# Patient Record
Sex: MALE | Race: WHITE | NOT HISPANIC OR LATINO | Employment: OTHER | RURAL
[De-identification: names, ages, dates, MRNs, and addresses within clinical notes are randomized per-mention and may not be internally consistent; named-entity substitution may affect disease eponyms.]

---

## 2017-06-15 ENCOUNTER — HISTORICAL (OUTPATIENT)
Dept: ADMINISTRATIVE | Facility: HOSPITAL | Age: 62
End: 2017-06-15

## 2017-06-16 LAB
LAB AP CLINICAL INFORMATION: NORMAL
LAB AP COMMENTS: NORMAL
LAB AP DIAGNOSIS - HISTORICAL: NORMAL
LAB AP MICROSCOPIC PATHOLOGY - HISTORICAL: NORMAL
LAB AP SPECIMEN SUBMITTED - HISTORICAL: NORMAL

## 2018-06-19 ENCOUNTER — HISTORICAL (OUTPATIENT)
Dept: ADMINISTRATIVE | Facility: HOSPITAL | Age: 63
End: 2018-06-19

## 2020-10-27 ENCOUNTER — HISTORICAL (OUTPATIENT)
Dept: ADMINISTRATIVE | Facility: HOSPITAL | Age: 65
End: 2020-10-27

## 2021-10-27 ENCOUNTER — OFFICE VISIT (OUTPATIENT)
Dept: UROLOGY | Facility: CLINIC | Age: 66
End: 2021-10-27
Payer: MEDICARE

## 2021-10-27 ENCOUNTER — HOSPITAL ENCOUNTER (OUTPATIENT)
Dept: RADIOLOGY | Facility: HOSPITAL | Age: 66
Discharge: HOME | End: 2021-10-27
Attending: UROLOGY
Payer: MEDICARE

## 2021-10-27 VITALS
WEIGHT: 198 LBS | BODY MASS INDEX: 23.38 KG/M2 | HEIGHT: 77 IN | SYSTOLIC BLOOD PRESSURE: 143 MMHG | HEART RATE: 78 BPM | DIASTOLIC BLOOD PRESSURE: 88 MMHG

## 2021-10-27 DIAGNOSIS — N32.0 BLADDER OUTLET OBSTRUCTION: ICD-10-CM

## 2021-10-27 DIAGNOSIS — Z12.5 SCREENING FOR PROSTATE CANCER: ICD-10-CM

## 2021-10-27 DIAGNOSIS — N13.8 BENIGN PROSTATIC HYPERPLASIA WITH URINARY OBSTRUCTION: ICD-10-CM

## 2021-10-27 DIAGNOSIS — N20.0 KIDNEY STONE: Primary | ICD-10-CM

## 2021-10-27 DIAGNOSIS — N40.1 BENIGN PROSTATIC HYPERPLASIA WITH URINARY OBSTRUCTION: ICD-10-CM

## 2021-10-27 DIAGNOSIS — N20.0 KIDNEY STONE: ICD-10-CM

## 2021-10-27 PROCEDURE — 74018 RADEX ABDOMEN 1 VIEW: CPT | Mod: TC

## 2021-10-27 PROCEDURE — 99213 PR OFFICE/OUTPT VISIT, EST, LEVL III, 20-29 MIN: ICD-10-PCS | Mod: S$PBB,,, | Performed by: UROLOGY

## 2021-10-27 PROCEDURE — 99213 OFFICE O/P EST LOW 20 MIN: CPT | Mod: S$PBB,,, | Performed by: UROLOGY

## 2021-10-27 PROCEDURE — 74018 XR KUB: ICD-10-PCS | Mod: 26,,, | Performed by: RADIOLOGY

## 2021-10-27 PROCEDURE — 74018 RADEX ABDOMEN 1 VIEW: CPT | Mod: 26,,, | Performed by: RADIOLOGY

## 2021-10-27 PROCEDURE — 99214 OFFICE O/P EST MOD 30 MIN: CPT | Mod: PBBFAC | Performed by: UROLOGY

## 2021-10-27 RX ORDER — TAMSULOSIN HYDROCHLORIDE 0.4 MG/1
1 CAPSULE ORAL 2 TIMES DAILY
COMMUNITY
Start: 2021-10-11

## 2021-10-27 RX ORDER — CELECOXIB 200 MG/1
CAPSULE ORAL
COMMUNITY

## 2021-10-27 RX ORDER — LOSARTAN POTASSIUM 25 MG/1
TABLET ORAL
COMMUNITY

## 2021-10-27 RX ORDER — LOVASTATIN 10 MG/1
TABLET ORAL
COMMUNITY

## 2021-10-27 RX ORDER — GABAPENTIN 300 MG/1
CAPSULE ORAL
COMMUNITY

## 2021-10-27 RX ORDER — ESOMEPRAZOLE MAGNESIUM 40 MG/1
CAPSULE, DELAYED RELEASE ORAL
COMMUNITY

## 2021-10-27 RX ORDER — ASPIRIN 81 MG/1
1 TABLET ORAL DAILY
COMMUNITY

## 2021-11-01 ENCOUNTER — TELEPHONE (OUTPATIENT)
Dept: UROLOGY | Facility: CLINIC | Age: 66
End: 2021-11-01
Payer: MEDICARE

## 2022-10-31 ENCOUNTER — HOSPITAL ENCOUNTER (OUTPATIENT)
Dept: RADIOLOGY | Facility: HOSPITAL | Age: 67
Discharge: HOME | End: 2022-10-31
Attending: UROLOGY
Payer: MEDICARE

## 2022-10-31 ENCOUNTER — OFFICE VISIT (OUTPATIENT)
Dept: UROLOGY | Facility: CLINIC | Age: 67
End: 2022-10-31
Payer: MEDICARE

## 2022-10-31 VITALS
HEART RATE: 81 BPM | DIASTOLIC BLOOD PRESSURE: 82 MMHG | SYSTOLIC BLOOD PRESSURE: 134 MMHG | HEIGHT: 77 IN | WEIGHT: 198 LBS | BODY MASS INDEX: 23.38 KG/M2

## 2022-10-31 DIAGNOSIS — N41.1 CHRONIC PROSTATITIS: ICD-10-CM

## 2022-10-31 DIAGNOSIS — N20.0 KIDNEY STONES: Primary | ICD-10-CM

## 2022-10-31 DIAGNOSIS — N13.8 BENIGN PROSTATIC HYPERPLASIA WITH URINARY OBSTRUCTION: ICD-10-CM

## 2022-10-31 DIAGNOSIS — N40.1 BENIGN PROSTATIC HYPERPLASIA WITH URINARY OBSTRUCTION: ICD-10-CM

## 2022-10-31 DIAGNOSIS — Z12.5 SCREENING FOR PROSTATE CANCER: ICD-10-CM

## 2022-10-31 DIAGNOSIS — N32.0 BLADDER OUTLET OBSTRUCTION: ICD-10-CM

## 2022-10-31 DIAGNOSIS — N20.0 KIDNEY STONE: ICD-10-CM

## 2022-10-31 PROCEDURE — 99213 PR OFFICE/OUTPT VISIT, EST, LEVL III, 20-29 MIN: ICD-10-PCS | Mod: S$PBB,,, | Performed by: UROLOGY

## 2022-10-31 PROCEDURE — 99213 OFFICE O/P EST LOW 20 MIN: CPT | Mod: S$PBB,,, | Performed by: UROLOGY

## 2022-10-31 PROCEDURE — 99214 OFFICE O/P EST MOD 30 MIN: CPT | Mod: PBBFAC | Performed by: UROLOGY

## 2022-10-31 PROCEDURE — 74018 RADEX ABDOMEN 1 VIEW: CPT | Mod: 26,,, | Performed by: STUDENT IN AN ORGANIZED HEALTH CARE EDUCATION/TRAINING PROGRAM

## 2022-10-31 PROCEDURE — 74018 XR KUB: ICD-10-PCS | Mod: 26,,, | Performed by: STUDENT IN AN ORGANIZED HEALTH CARE EDUCATION/TRAINING PROGRAM

## 2022-10-31 PROCEDURE — 74018 RADEX ABDOMEN 1 VIEW: CPT | Mod: TC

## 2022-10-31 NOTE — PROGRESS NOTES
"Subjective:       Patient ID: Gamaliel Castillo is a 67 y.o. male.    Chief Complaint: Follow-up (One year visit, KUB)       Chief Complaint/Reason For Encounter  "One year visit, KUB";   Benign prostatic hyperplasia with lower urinary tract symptoms; Bladder-neck obstruction; Encounter for screening for malignant neoplasm of prostate; Kidney stone     History of Present Illness  Mr. Castillo is 59 years old. Sent to me by Ms. Lisha Liz because of bladder outlet obstructive symptoms. He has had slowing of his stream with increased difficulty voiding for the last couple of years. He has nocturia only one time nightly if he restricts fluids, but sometimes  he does not restrict fluids. He has no history of previous treatment for prostate problems. He has never taken alpha blockers or any other medication to help with his urine flow. He has no history of urinary tract infections, hematuria, stones, or other problems except for the decreased size and force to his stream with increased sensation of restrictive flow. Apparently his PSA was 0.7 earlier this year. No family history of prostate cancer. He has friends who have prostate cancer and thinks about them when he has difficulty voiding. Otherwise negative  history  (August 6, 2015)     Mr. Castillo returns for follow-up. He did not improve any after being placed on Flomax on the above visit. He was placed on that with the idea that it should help with bladder outlet obstructive symptoms. It has not helped or at least not helped significantly despite the fact he still taking the medication. A couple weeks after that visit he had onset of left ureteral colic with pain in the left upper quadrant. He was told he had a stone in the emergency room. He initially went to the emergency room at [facility] in [place]. He had a CT scan done there. He subsequently went to the emergency room at [facility] where a CT was done with contrast. No stone was really found on " the imaging.   He had one previous stone on the right side about 15 years ago that apparently passed. This is the first episode of colicky pain he has had since that time. Currently asymptomatic. He is not aware if he has passed a stone or not. Brought CT scan from [facility] on disc.  (September 25, 2015)     Mr. Castillo has improved. Taking the Flomax twice daily has improved his situation with voiding significantly. He has nocturia only one time nightly now which is significantly better. He thinks he had his PSA done by Mrs. Liz when he has regular lab work done. I Do not have a copy of that. I would like to make sure that that was normal but presume that it was, or he would have been notified. Patient desires no additional help with his current situation and just wants to continue on Flomax twice a day.  (November 30, 2015)     Mr. Castillo is in for his yearly checkup. He had a stone that he had  on around August 15. He had gone to the [facility] emergency room where he had a CT scan. It is uncertain exactly the size of the stone or the exact location. He has continued to have some discomfort but has been relatively mild. He had a severe colicky episode September 5. No Other new  problems since last year Until last month. He has not been straining his urine and is unaware if stone might have passed or not. To his knowledge he has not passed a stone however.  (  He did not bring a copy of the CT scan on disc with him. Patient has not had a PSA to his knowledge. If He has had one he does not know what it is. (September 14, 2016.)     Since the visit last year Mr. Castillo is had one of the stone requiring to the emergency room. He thinks he passed that stone. He started having some suprapubic pain it is not localized to the right and left just just since this morning and felt it was probably a stone because of the severity of the pain. He has not had flank pain. He has voided better since we don't tamsulosin and  has nocturia usually does one time nightly. He also thinks that she's been on that drug the is has an easy year time passing stones as they seem to come through faster. Usually has worked to stones or year. He feels this is a stone today. PSA pending.  (September 19, 2017)     Mr. Castillo is in for his yearly checkup. Unfortunately his appointment was made one day ahead of when it should've been, so he cannot get his PSA today for PSA screening. He has had no further stone problems since he was here last year. 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 Patient has satisfactory stream with nocturia one time nightly. Otherwise no new health problems.  (September 18, 2018)     Mr. Castillo is in for his yearly checkup. He had a KUB today because of previous stone that he had in 2016. He is not aware of any recent stone or any problems from stones of any type. He is trying to keep increased fluid intake. He had PSA last year in May that was 1.04. Planning on getting the PSA when he has lab work by Ms. Ding this week. Overall satisfactory year. He continues to void reasonably well so long as he takes the tamsulosin twice a day. No new problems from  standpoint.  (October 1, 2019)     Mr. Castillo is in for one-year follow-up for stone problems and for PSA check and prostate exam. He injured his finger requiring a trip to the emergency room but otherwise done fairly well this year. Had a partial knee replacement in February. He has no increased trouble with voiding. Nocturia x2. He has had no stone pain or awareness of stone problems this year. PSA done on October 3, 2019 was 1.44.  Apparently has not had another PSA this year so far. (October 27, 2020)     PSA Results:  Past PSA Results   PSA was 1.44 on October 3, 2019  PSA was 1.04 on May 11, 2018  PSA was 1.020 on September 19, 2017  PSA was 1.280 on September 14,  2016  -----------------------------------------------------------------------------------------------------------------------------------------------------------------------------------------------------------------------------------------------------------------------------  The above information is from the old electronic medical record.    PSA was 1.57 on January 8, 2021     Mr. Castillo is in for his yearly check with KUB.  He had his PSA done by Ms. Ding within last year and we are not sure of the results.  Overall satisfactory year with no stone problems.  No worsening bladder outlet obstructive problems.  Has nocturia x2 with aid of tamsulosin.  (October 27, 2021)    Mr. Castillo is in for  yearly checkup.  He feels like he is doing reasonably well taking the tamsulosin b.i.d..  No worsening bladder outlet obstructive symptoms.  He has nocturia 2 times nightly which is what it has been for recent past.  He has had no new health problems.  His wife has problems primarily related to a fall she had several months ago.  PSA usually done by nurse practitioner but we do not have recent result.  Last year was 1.57.  [October 31, 2022]         Review of Systems      Objective:      Physical Exam  Constitutional:       Appearance: Normal appearance. He is normal weight.   Genitourinary:     Prostate: Normal.      Rectum: Normal.      Comments: Prostate was  30 g slightly firmer on the right side than the left but otherwise symmetrical  Neurological:      Mental Status: He is alert.   Psychiatric:         Mood and Affect: Mood normal.         Behavior: Behavior normal.     Urinalysis was unremarkable with occasional pus cells.  The dipstick negative pH of 6.0 and specific gravity 1.010  Assessment:       Problem List Items Addressed This Visit    None  Visit Diagnoses       Kidney stones    -  Primary    Relevant Orders    X-Ray KUB    Benign prostatic hyperplasia with urinary obstruction        Bladder outlet  obstruction        Chronic prostatitis        Screening for prostate cancer                Plan:         Patient doing okay clinically.  Continue tamsulosin b.i.d. He will let me know if he needs it renewed.  KUB reviewed with patient and no definite stones noted.  He will continue to get his PSAs by Ms. Ding.  One year appointment with BRENDA or sooner if needed.

## 2022-11-01 NOTE — PATIENT INSTRUCTIONS
Patient doing okay clinically.  Continue tamsulosin b.i.d. He will let me know if he needs it renewed.  KUB reviewed with patient and no definite stones noted.  He will continue to get his PSAs by Ms. Ding.  One year appointment with KUDELFINO or sooner if needed.

## 2023-10-31 ENCOUNTER — HOSPITAL ENCOUNTER (OUTPATIENT)
Dept: RADIOLOGY | Facility: HOSPITAL | Age: 68
Discharge: HOME | End: 2023-10-31
Attending: UROLOGY
Payer: MEDICARE

## 2023-10-31 ENCOUNTER — OFFICE VISIT (OUTPATIENT)
Dept: UROLOGY | Facility: CLINIC | Age: 68
End: 2023-10-31
Payer: MEDICARE

## 2023-10-31 VITALS
WEIGHT: 190 LBS | HEIGHT: 77 IN | DIASTOLIC BLOOD PRESSURE: 86 MMHG | HEART RATE: 77 BPM | SYSTOLIC BLOOD PRESSURE: 129 MMHG | BODY MASS INDEX: 22.43 KG/M2

## 2023-10-31 DIAGNOSIS — N41.1 CHRONIC PROSTATITIS: ICD-10-CM

## 2023-10-31 DIAGNOSIS — N20.0 KIDNEY STONES: ICD-10-CM

## 2023-10-31 DIAGNOSIS — N20.0 KIDNEY STONES: Primary | ICD-10-CM

## 2023-10-31 DIAGNOSIS — N13.8 BENIGN PROSTATIC HYPERPLASIA WITH URINARY OBSTRUCTION: ICD-10-CM

## 2023-10-31 DIAGNOSIS — N32.0 BLADDER OUTLET OBSTRUCTION: ICD-10-CM

## 2023-10-31 DIAGNOSIS — N40.1 BENIGN PROSTATIC HYPERPLASIA WITH URINARY OBSTRUCTION: ICD-10-CM

## 2023-10-31 DIAGNOSIS — Z12.5 SCREENING FOR PROSTATE CANCER: ICD-10-CM

## 2023-10-31 PROCEDURE — 99214 OFFICE O/P EST MOD 30 MIN: CPT | Mod: PBBFAC | Performed by: UROLOGY

## 2023-10-31 PROCEDURE — 74018 RADEX ABDOMEN 1 VIEW: CPT | Mod: TC

## 2023-10-31 PROCEDURE — 99213 PR OFFICE/OUTPT VISIT, EST, LEVL III, 20-29 MIN: ICD-10-PCS | Mod: S$PBB,,, | Performed by: UROLOGY

## 2023-10-31 PROCEDURE — 99213 OFFICE O/P EST LOW 20 MIN: CPT | Mod: S$PBB,,, | Performed by: UROLOGY

## 2023-10-31 PROCEDURE — 74018 XR KUB: ICD-10-PCS | Mod: 26,,, | Performed by: RADIOLOGY

## 2023-10-31 PROCEDURE — 74018 RADEX ABDOMEN 1 VIEW: CPT | Mod: 26,,, | Performed by: RADIOLOGY

## 2023-10-31 RX ORDER — HYDROXYCHLOROQUINE SULFATE 200 MG/1
200 TABLET, FILM COATED ORAL 2 TIMES DAILY
COMMUNITY
Start: 2023-10-05

## 2023-10-31 NOTE — PATIENT INSTRUCTIONS
KUB seems to show a few upper tract stones.  Nothing that looks like it is in line with the ureters.  He understands the stones could drop out of the kidneys at any time and we will see if he has trouble.  PSA done by Ms. Ding apparently normal.  Patient understands I plan to retire next year.  If he has any stone issues etc. before then I will see him but otherwise will have his routine follow-up by Ms. Sun Ding and urological referral as necessary.

## 2023-10-31 NOTE — PROGRESS NOTES
"Subjective     Patient ID: Gamaliel Castillo is a 68 y.o. male.    Chief Complaint: Follow-up (One year visit, KUB)       Chief Complaint/Reason For Encounter  "One year visit, KUB";   Benign prostatic hyperplasia with lower urinary tract symptoms; Bladder-neck obstruction; Encounter for screening for malignant neoplasm of prostate; Kidney stone     History of Present Illness  Mr. Castillo is 59 years old. Sent to me by Ms. Lisha Liz because of bladder outlet obstructive symptoms. He has had slowing of his stream with increased difficulty voiding for the last couple of years. He has nocturia only one time nightly if he restricts fluids, but sometimes  he does not restrict fluids. He has no history of previous treatment for prostate problems. He has never taken alpha blockers or any other medication to help with his urine flow. He has no history of urinary tract infections, hematuria, stones, or other problems except for the decreased size and force to his stream with increased sensation of restrictive flow. Apparently his PSA was 0.7 earlier this year. No family history of prostate cancer. He has friends who have prostate cancer and thinks about them when he has difficulty voiding. Otherwise negative  history  (August 6, 2015)     Mr. Castillo returns for follow-up. He did not improve any after being placed on Flomax on the above visit. He was placed on that with the idea that it should help with bladder outlet obstructive symptoms. It has not helped or at least not helped significantly despite the fact he still taking the medication. A couple weeks after that visit he had onset of left ureteral colic with pain in the left upper quadrant. He was told he had a stone in the emergency room. He initially went to the emergency room at [facility] in [place]. He had a CT scan done there. He subsequently went to the emergency room at [facility] where a CT was done with contrast. No stone was really found on the " imaging.   He had one previous stone on the right side about 15 years ago that apparently passed. This is the first episode of colicky pain he has had since that time. Currently asymptomatic. He is not aware if he has passed a stone or not. Brought CT scan from [facility] on disc.  (September 25, 2015)     Mr. Castillo has improved. Taking the Flomax twice daily has improved his situation with voiding significantly. He has nocturia only one time nightly now which is significantly better. He thinks he had his PSA done by Mrs. Liz when he has regular lab work done. I Do not have a copy of that. I would like to make sure that that was normal but presume that it was, or he would have been notified. Patient desires no additional help with his current situation and just wants to continue on Flomax twice a day.  (November 30, 2015)     Mr. Castillo is in for his yearly checkup. He had a stone that he had  on around August 15. He had gone to the [facility] emergency room where he had a CT scan. It is uncertain exactly the size of the stone or the exact location. He has continued to have some discomfort but has been relatively mild. He had a severe colicky episode September 5. No Other new  problems since last year Until last month. He has not been straining his urine and is unaware if stone might have passed or not. To his knowledge he has not passed a stone however.  (  He did not bring a copy of the CT scan on disc with him. Patient has not had a PSA to his knowledge. If He has had one he does not know what it is. (September 14, 2016.)     Since the visit last year Mr. Castillo is had one of the stone requiring to the emergency room. He thinks he passed that stone. He started having some suprapubic pain it is not localized to the right and left just just since this morning and felt it was probably a stone because of the severity of the pain. He has not had flank pain. He has voided better since we don't tamsulosin and has  nocturia usually does one time nightly. He also thinks that she's been on that drug the is has an easy year time passing stones as they seem to come through faster. Usually has worked to stones or year. He feels this is a stone today. PSA pending.  (September 19, 2017)     Mr. Castillo is in for his yearly checkup. Unfortunately his appointment was made one day ahead of when it should've been, so he cannot get his PSA today for PSA screening. He has had no further stone problems since he was here last year. 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 Patient has satisfactory stream with nocturia one time nightly. Otherwise no new health problems.  (September 18, 2018)     Mr. Castillo is in for his yearly checkup. He had a KUB today because of previous stone that he had in 2016. He is not aware of any recent stone or any problems from stones of any type. He is trying to keep increased fluid intake. He had PSA last year in May that was 1.04. Planning on getting the PSA when he has lab work by Ms. Ding this week. Overall satisfactory year. He continues to void reasonably well so long as he takes the tamsulosin twice a day. No new problems from  standpoint.  (October 1, 2019)     Mr. Castillo is in for one-year follow-up for stone problems and for PSA check and prostate exam. He injured his finger requiring a trip to the emergency room but otherwise done fairly well this year. Had a partial knee replacement in February. He has no increased trouble with voiding. Nocturia x2. He has had no stone pain or awareness of stone problems this year. PSA done on October 3, 2019 was 1.44.  Apparently has not had another PSA this year so far. (October 27, 2020)     PSA Results:  Past PSA Results   PSA was 1.44 on October 3, 2019  PSA was 1.04 on May 11, 2018  PSA was 1.020 on September 19, 2017  PSA was 1.280 on September 14,  2016  -----------------------------------------------------------------------------------------------------------------------------------------------------------------------------------------------------------------------------------------------------------------------------  The above information is from the old electronic medical record.     PSA was 1.57 on January 8, 2021     Mr. Castillo is in for his yearly check with KUB.  He had his PSA done by Ms. Ding within last year and we are not sure of the results.  Overall satisfactory year with no stone problems.  No worsening bladder outlet obstructive problems.  Has nocturia x2 with aid of tamsulosin.  (October 27, 2021)     Mr. Castillo is in for  yearly checkup.  He feels like he is doing reasonably well taking the tamsulosin b.i.d..  No worsening bladder outlet obstructive symptoms.  He has nocturia 2 times nightly which is what it has been for recent past.  He has had no new health problems.  His wife has problems primarily related to a fall she had several months ago.  PSA usually done by nurse practitioner but we do not have recent result.  Last year was 1.57.  [October 31, 2022]    Mr. Castillo is in for his yearly check with KUB.  He is still on tamsulosin and is voiding reasonably well with no worsening over the last year.  Had an eventful year in that he had a tick bite in June that resulted in complications of arthritis.  Disease was apparently felt to be Lyme disease or William mountain spotted fever.  Continued slow progress with recovery.  From  standpoint feels like he is fairly stable.   Had a PSA that was low normal but does not remember the value.  He gets his lab work from Ms. Ding. [October 31, 2023]       Review of Systems       Objective     Physical Exam  Constitutional:       Appearance: Normal appearance. He is normal weight.   Genitourinary:     Prostate: Normal.      Rectum: Normal.      Comments: Prostate 30-40 g smooth symmetrical and  benign  Neurological:      Mental Status: He is alert.   Psychiatric:         Mood and Affect: Mood normal.         Behavior: Behavior normal.     Urinalysis unremarkable with only occasional pus cells and occasional epithelial cells.  The dipstick had a trace of protein with pH 7.5 and specific gravity 1.015     Assessment and Plan     1. Kidney stones    2. Benign prostatic hyperplasia with urinary obstruction    3. Bladder outlet obstruction    4. Chronic prostatitis    5. Screening for prostate cancer      KUB seems to show a few upper tract stones.  Nothing that looks like it is in line with the ureters.  He understands the stones could drop out of the kidneys at any time and we will see if he has trouble.  PSA done by Ms. Ding apparently normal.  Patient understands I plan to retire next year.  If he has any stone issues etc. before then I will see him but otherwise will have his routine follow-up by Ms. Sun Ding and urological referral as necessary.          No follow-ups on file.

## 2024-10-31 ENCOUNTER — OFFICE VISIT (OUTPATIENT)
Dept: UROLOGY | Facility: CLINIC | Age: 69
End: 2024-10-31
Payer: MEDICARE

## 2024-10-31 ENCOUNTER — HOSPITAL ENCOUNTER (OUTPATIENT)
Dept: RADIOLOGY | Facility: HOSPITAL | Age: 69
Discharge: HOME | End: 2024-10-31
Attending: NURSE PRACTITIONER
Payer: MEDICARE

## 2024-10-31 VITALS
OXYGEN SATURATION: 98 % | HEART RATE: 86 BPM | DIASTOLIC BLOOD PRESSURE: 85 MMHG | BODY MASS INDEX: 21.94 KG/M2 | WEIGHT: 185 LBS | SYSTOLIC BLOOD PRESSURE: 142 MMHG

## 2024-10-31 DIAGNOSIS — N40.1 BENIGN PROSTATIC HYPERPLASIA WITH URINARY OBSTRUCTION: ICD-10-CM

## 2024-10-31 DIAGNOSIS — N20.0 KIDNEY STONE: Primary | ICD-10-CM

## 2024-10-31 DIAGNOSIS — N20.0 KIDNEY STONE: ICD-10-CM

## 2024-10-31 DIAGNOSIS — N13.8 BENIGN PROSTATIC HYPERPLASIA WITH URINARY OBSTRUCTION: ICD-10-CM

## 2024-10-31 DIAGNOSIS — N32.0 BLADDER NECK OBSTRUCTION: ICD-10-CM

## 2024-10-31 PROCEDURE — 74018 RADEX ABDOMEN 1 VIEW: CPT | Mod: 26,,, | Performed by: RADIOLOGY

## 2024-10-31 PROCEDURE — 99213 OFFICE O/P EST LOW 20 MIN: CPT | Mod: S$PBB,,, | Performed by: NURSE PRACTITIONER

## 2024-10-31 PROCEDURE — 99999 PR PBB SHADOW E&M-EST. PATIENT-LVL IV: CPT | Mod: PBBFAC,,, | Performed by: NURSE PRACTITIONER

## 2024-10-31 PROCEDURE — 99214 OFFICE O/P EST MOD 30 MIN: CPT | Mod: PBBFAC,25 | Performed by: NURSE PRACTITIONER

## 2024-10-31 PROCEDURE — 74018 RADEX ABDOMEN 1 VIEW: CPT | Mod: TC

## 2024-10-31 RX ORDER — ERGOCALCIFEROL 1.25 MG/1
50000 CAPSULE ORAL
COMMUNITY

## 2024-10-31 RX ORDER — FINASTERIDE 5 MG/1
TABLET, FILM COATED ORAL
Qty: 90 TABLET | Refills: 3 | Status: SHIPPED | OUTPATIENT
Start: 2024-10-31

## 2024-10-31 RX ORDER — SULFASALAZINE 500 MG/1
3 TABLET ORAL 2 TIMES DAILY
COMMUNITY
Start: 2023-06-15

## 2024-10-31 RX ORDER — TAMSULOSIN HYDROCHLORIDE 0.4 MG/1
CAPSULE ORAL
Qty: 180 CAPSULE | Refills: 3 | Status: SHIPPED | OUTPATIENT
Start: 2024-10-31

## 2025-02-03 ENCOUNTER — OFFICE VISIT (OUTPATIENT)
Dept: UROLOGY | Facility: CLINIC | Age: 70
End: 2025-02-03
Payer: MEDICARE

## 2025-02-03 VITALS
WEIGHT: 185 LBS | HEART RATE: 78 BPM | BODY MASS INDEX: 21.84 KG/M2 | OXYGEN SATURATION: 99 % | DIASTOLIC BLOOD PRESSURE: 81 MMHG | SYSTOLIC BLOOD PRESSURE: 134 MMHG | HEIGHT: 77 IN

## 2025-02-03 DIAGNOSIS — N20.0 KIDNEY STONE: ICD-10-CM

## 2025-02-03 DIAGNOSIS — N40.1 BENIGN PROSTATIC HYPERPLASIA WITH URINARY OBSTRUCTION: Primary | Chronic | ICD-10-CM

## 2025-02-03 DIAGNOSIS — R35.1 NOCTURIA: ICD-10-CM

## 2025-02-03 DIAGNOSIS — R39.12 WEAK URINARY STREAM: ICD-10-CM

## 2025-02-03 DIAGNOSIS — N32.0 BLADDER NECK OBSTRUCTION: ICD-10-CM

## 2025-02-03 DIAGNOSIS — N13.8 BENIGN PROSTATIC HYPERPLASIA WITH URINARY OBSTRUCTION: Primary | Chronic | ICD-10-CM

## 2025-02-03 PROCEDURE — 99214 OFFICE O/P EST MOD 30 MIN: CPT | Mod: S$PBB,,, | Performed by: NURSE PRACTITIONER

## 2025-02-03 PROCEDURE — 99999 PR PBB SHADOW E&M-EST. PATIENT-LVL IV: CPT | Mod: PBBFAC,,, | Performed by: NURSE PRACTITIONER

## 2025-02-03 PROCEDURE — 99214 OFFICE O/P EST MOD 30 MIN: CPT | Mod: PBBFAC | Performed by: NURSE PRACTITIONER

## 2025-02-03 NOTE — PATIENT INSTRUCTIONS
1. Continue tamsulosin (Flomax) 0.4 mg 1 capsule in the morning and 1 capsule at night  2. Continue finasteride (Proscar) 5 mg 1 tablet daily  3. Continue PSAs with PCP  4. Follow-up with urology in 6 months or sooner if needed

## 2025-02-03 NOTE — PROGRESS NOTES
Subjective     Patient ID: Gamaliel Castillo is a 69 y.o. male.    Chief Complaint:  Follow-up of BPH with LUTS    This pleasant 69-year-old male presents to the clinic for follow-up of BPH with LUTS and history of kidney stone.  Patient states he is doing great.  He denies any new urological complaints.  He denies any signs or symptoms of kidney stone or has any kidney stone complaints.  He is pleased with the way he is voiding.  He is on Flomax 0.4 mg 1 capsule twice a day and finasteride 5 mg daily.  He is tolerating these medicines without side effects.  His IPSS score is 7 that reflects frequency, weak stream, and nocturia 2 times a night.  He denies dysuria, hematuria, incomplete bladder emptying, intermittency, urgency, or straining to urinate.  He denies fever, chills, nausea, or vomiting.  He denies any  pains.  He gets his PSAs with his PCP and reports the PSAs are within normal limits.  Through shared decision-making with the patient, he desires to continue the Flomax and finasteride as prescribed.  He desires to continue to monitor the PSA with his primary care provider.  He desires to follow-up with urology in 6 months or sooner if needed.  I discussed the plan in detail with the patient and he is in agreement with the plan.  All his questions were answered at today's visit.  I spent 30 minutes counseling this patient, reviewing the chart, imaging and labs.  ------------------------------------------------------  [February 3, 2025].       Review of Systems   Constitutional:  Negative for activity change and fever.   HENT:  Negative for hearing loss and trouble swallowing.    Eyes:  Negative for visual disturbance.   Respiratory:  Negative for cough, shortness of breath and wheezing.    Cardiovascular:  Negative for chest pain.   Gastrointestinal:  Negative for abdominal pain, diarrhea, nausea and vomiting.   Endocrine: Negative for polyuria.   Genitourinary:  Negative for bladder incontinence,  decreased urine volume, difficulty urinating, discharge, dysuria, enuresis, erectile dysfunction, flank pain, frequency, genital sores, hematuria, penile pain, penile swelling, scrotal swelling, testicular pain and urgency.        BPH with LUTS       History of kidney stone       Nocturia       Bladder outlet obstruction       Weak urinary stream   Musculoskeletal:  Negative for back pain and gait problem.   Integumentary:  Negative for rash.   Neurological:  Negative for speech difficulty and weakness.   Psychiatric/Behavioral:  Negative for behavioral problems and confusion.           Objective     Physical Exam  Vitals and nursing note reviewed.   Constitutional:       General: He is not in acute distress.     Appearance: Normal appearance. He is not ill-appearing, toxic-appearing or diaphoretic.   HENT:      Head: Normocephalic.   Eyes:      Extraocular Movements: Extraocular movements intact.   Cardiovascular:      Rate and Rhythm: Normal rate and regular rhythm.      Heart sounds: Normal heart sounds.   Pulmonary:      Effort: Pulmonary effort is normal. No respiratory distress.      Breath sounds: Normal breath sounds. No wheezing, rhonchi or rales.   Abdominal:      General: Bowel sounds are normal.      Palpations: Abdomen is soft.      Tenderness: There is no abdominal tenderness. There is no right CVA tenderness, left CVA tenderness, guarding or rebound.   Musculoskeletal:         General: Normal range of motion.      Cervical back: Normal range of motion. No rigidity.   Skin:     General: Skin is warm and dry.   Neurological:      General: No focal deficit present.      Mental Status: He is alert and oriented to person, place, and time.      Motor: No weakness.      Coordination: Coordination normal.      Gait: Gait normal.   Psychiatric:         Mood and Affect: Mood normal.         Behavior: Behavior normal.         Thought Content: Thought content normal.          Assessment and Plan     1. Benign  prostatic hyperplasia with urinary obstruction    2. Bladder neck obstruction    3. Kidney stone    4. Nocturia    5. Weak urinary stream                 1. Continue tamsulosin (Flomax) 0.4 mg 1 capsule in the morning and 1 capsule at night  2. Continue finasteride (Proscar) 5 mg 1 tablet daily  3. Continue PSAs with PCP  4. Follow-up with urology in 6 months or sooner if needed

## 2025-08-01 NOTE — PROGRESS NOTES
Subjective     Patient ID: Gamaliel Castillo is a 69 y.o. male.    Chief Complaint:  Follow-up of BPH with LUTS and history of kidney stones    This pleasant 69 year old male presents to the clinic for follow up of BPH with LUTS and kidney stone history. Patient states he is doing great today.  He denies any new urological complaints.  He is now delighted with the way he is voiding.  He is on Flomax 0.4 mg 1 capsule twice a day and finasteride 5 mg daily.  He is tolerating these medicines without side effects.  His IPSS score is 1 that reflects nocturia 1 time a night.  He denies dysuria, hematuria, incomplete bladder emptying, frequency, intermittency, urgency, weak stream, or straining to urinate.  He denies fever, chills, nausea, or vomiting.  He denies any  pains or kidney stone pains.  He denies signs and symptoms of an infection or prostatitis.  He denies any flank pain.  He gets his PSA's/LILY with his PCP and reports the PSA's are WNL.  He reports his primary care provider also renewed his medications.  He does have a history of kidney stone.  His last KUB done on October 31, 2024 showed  Negative abdomen x-ray. Through shared decision-making with the patient, he desires to continue the Flomax and finasteride as prescribed.  He desires to continue to have the PSA and LILY monitored by his primary care.  He is delighted with the way he is voiding and desires no additional intervention at this time for voiding symptoms.  He desires to go to a 1 year follow-up appointment.  I discussed the plan in detail with the patient and he is in agreement with the plan.  All his questions were answered at today's visit.  I spent 30 minutes counseling this patient, reviewing the chart, imaging and labs.  ---------------  [August 6, 2025].         Review of Systems   Constitutional:  Negative for activity change and fever.   HENT:  Negative for hearing loss and trouble swallowing.    Eyes:  Negative for visual  disturbance.   Respiratory:  Negative for cough, shortness of breath and wheezing.    Cardiovascular:  Negative for chest pain.   Gastrointestinal:  Negative for abdominal pain, diarrhea, nausea and vomiting.   Endocrine: Negative for polyuria.   Genitourinary:  Negative for bladder incontinence, decreased urine volume, difficulty urinating, discharge, dysuria, enuresis, erectile dysfunction, flank pain, frequency, genital sores, hematuria, penile pain, penile swelling, scrotal swelling, testicular pain and urgency.        BPH with LUTS       History of kidney stone       Bladder outlet obstruction       Nocturia   Musculoskeletal:  Negative for back pain and gait problem.   Integumentary:  Negative for rash.   Neurological:  Negative for speech difficulty and weakness.   Psychiatric/Behavioral:  Negative for behavioral problems and confusion.           Objective     Physical Exam  Vitals and nursing note reviewed.   Constitutional:       General: He is not in acute distress.     Appearance: Normal appearance. He is not ill-appearing, toxic-appearing or diaphoretic.   HENT:      Head: Normocephalic.   Eyes:      Extraocular Movements: Extraocular movements intact.   Cardiovascular:      Rate and Rhythm: Normal rate and regular rhythm.      Heart sounds: Normal heart sounds.   Pulmonary:      Effort: Pulmonary effort is normal. No respiratory distress.      Breath sounds: Normal breath sounds. No wheezing, rhonchi or rales.   Abdominal:      General: Bowel sounds are normal.      Palpations: Abdomen is soft.      Tenderness: There is no abdominal tenderness. There is no right CVA tenderness, left CVA tenderness, guarding or rebound.   Genitourinary:     Comments: Patient deferred LILY and PSA to primary care provider  Musculoskeletal:         General: Normal range of motion.      Cervical back: Normal range of motion. No rigidity.   Skin:     General: Skin is warm and dry.   Neurological:      General: No focal  deficit present.      Mental Status: He is alert and oriented to person, place, and time.      Motor: No weakness.      Coordination: Coordination normal.      Gait: Gait normal.   Psychiatric:         Mood and Affect: Mood normal.         Behavior: Behavior normal.         Thought Content: Thought content normal.          Assessment and Plan     1. Benign prostatic hyperplasia with urinary obstruction    2. Kidney stone    3. Bladder neck obstruction    4. Nocturia             1. Continue tamsulosin (Flomax) 0.4 mg 1 capsule in the morning and 1 capsule at night  2. Continue finasteride (Proscar) 5 mg 1 tablet daily  3. Continue PSAs/LILY with PCP  4. Patient is pleased with the way he is voiding and desires no additional intervention for voiding symptoms  4. Follow-up with urology in 1 year or sooner if needed

## 2025-08-06 ENCOUNTER — OFFICE VISIT (OUTPATIENT)
Dept: UROLOGY | Facility: CLINIC | Age: 70
End: 2025-08-06
Payer: MEDICARE

## 2025-08-06 VITALS
BODY MASS INDEX: 21.84 KG/M2 | HEIGHT: 77 IN | HEART RATE: 70 BPM | OXYGEN SATURATION: 97 % | WEIGHT: 185 LBS | DIASTOLIC BLOOD PRESSURE: 82 MMHG | SYSTOLIC BLOOD PRESSURE: 161 MMHG

## 2025-08-06 DIAGNOSIS — R35.1 NOCTURIA: ICD-10-CM

## 2025-08-06 DIAGNOSIS — N40.1 BENIGN PROSTATIC HYPERPLASIA WITH URINARY OBSTRUCTION: Primary | Chronic | ICD-10-CM

## 2025-08-06 DIAGNOSIS — N20.0 KIDNEY STONE: Chronic | ICD-10-CM

## 2025-08-06 DIAGNOSIS — N13.8 BENIGN PROSTATIC HYPERPLASIA WITH URINARY OBSTRUCTION: Primary | Chronic | ICD-10-CM

## 2025-08-06 DIAGNOSIS — N32.0 BLADDER NECK OBSTRUCTION: Chronic | ICD-10-CM

## 2025-08-06 PROBLEM — B35.1 ONYCHOMYCOSIS OF TOENAIL: Status: ACTIVE | Noted: 2022-07-26

## 2025-08-06 PROBLEM — E55.9 VITAMIN D DEFICIENCY: Status: ACTIVE | Noted: 2024-02-04

## 2025-08-06 PROBLEM — K21.9 GASTROESOPHAGEAL REFLUX DISEASE: Status: ACTIVE | Noted: 2022-01-05

## 2025-08-06 PROBLEM — M25.50 ARTHRALGIA OF MULTIPLE JOINTS: Status: ACTIVE | Noted: 2023-07-27

## 2025-08-06 PROBLEM — M35.3 POLYMYALGIA RHEUMATICA: Status: ACTIVE | Noted: 2023-08-30

## 2025-08-06 PROBLEM — E04.1 NON-TOXIC UNINODULAR GOITER: Status: ACTIVE | Noted: 2017-05-30

## 2025-08-06 PROCEDURE — 99999 PR PBB SHADOW E&M-EST. PATIENT-LVL IV: CPT | Mod: PBBFAC,,, | Performed by: NURSE PRACTITIONER

## 2025-08-06 PROCEDURE — 99214 OFFICE O/P EST MOD 30 MIN: CPT | Mod: S$PBB,,, | Performed by: NURSE PRACTITIONER

## 2025-08-06 PROCEDURE — 99214 OFFICE O/P EST MOD 30 MIN: CPT | Mod: PBBFAC | Performed by: NURSE PRACTITIONER

## 2025-08-06 RX ORDER — CEFADROXIL 500 MG/1
CAPSULE ORAL
COMMUNITY
Start: 2025-01-28

## 2025-08-06 RX ORDER — HYDROMORPHONE HYDROCHLORIDE 2 MG/1
TABLET ORAL
COMMUNITY
Start: 2025-01-28

## 2025-08-06 RX ORDER — ENOXAPARIN SODIUM 100 MG/ML
INJECTION SUBCUTANEOUS
COMMUNITY
Start: 2025-01-28

## 2025-08-06 RX ORDER — ONDANSETRON 8 MG/1
TABLET, ORALLY DISINTEGRATING ORAL
COMMUNITY

## 2025-08-06 RX ORDER — AMLODIPINE BESYLATE 5 MG/1
5 TABLET ORAL
COMMUNITY

## 2025-08-06 RX ORDER — OXYCODONE AND ACETAMINOPHEN 5; 325 MG/1; MG/1
TABLET ORAL
COMMUNITY
Start: 2025-01-28

## 2025-08-06 NOTE — PATIENT INSTRUCTIONS
1. Continue tamsulosin (Flomax) 0.4 mg 1 capsule in the morning and 1 capsule at night  2. Continue finasteride (Proscar) 5 mg 1 tablet daily  3. Continue PSAs/LILY with PCP  4. Patient is pleased with the way he is voiding and desires no additional intervention for voiding symptoms  4. Follow-up with urology in 1 year or sooner if needed